# Patient Record
Sex: MALE | Race: WHITE | HISPANIC OR LATINO | URBAN - METROPOLITAN AREA
[De-identification: names, ages, dates, MRNs, and addresses within clinical notes are randomized per-mention and may not be internally consistent; named-entity substitution may affect disease eponyms.]

---

## 2020-10-10 ENCOUNTER — OFFICE VISIT (OUTPATIENT)
Dept: URBAN - METROPOLITAN AREA CLINIC 121 | Facility: CLINIC | Age: 40
End: 2020-10-10

## 2021-03-30 ENCOUNTER — OFFICE VISIT (OUTPATIENT)
Dept: URBAN - METROPOLITAN AREA CLINIC 63 | Facility: CLINIC | Age: 41
End: 2021-03-30

## 2021-06-29 ENCOUNTER — OFFICE VISIT (OUTPATIENT)
Dept: URBAN - METROPOLITAN AREA CLINIC 63 | Facility: CLINIC | Age: 41
End: 2021-06-29

## 2022-06-15 ENCOUNTER — RX ONLY (OUTPATIENT)
Age: 42
Setting detail: RX ONLY
End: 2022-06-15

## 2022-07-09 ENCOUNTER — TELEPHONE ENCOUNTER (OUTPATIENT)
Dept: URBAN - METROPOLITAN AREA CLINIC 121 | Facility: CLINIC | Age: 42
End: 2022-07-09

## 2022-07-09 RX ORDER — OMEPRAZOLE 20 MG/1
CAPSULE, DELAYED RELEASE ORAL
Refills: 0 | OUTPATIENT
Start: 2021-03-09 | End: 2021-03-30

## 2022-07-09 RX ORDER — DOXYCYCLINE HYCLATE 100 MG/1
CAPSULE ORAL
Refills: 0 | OUTPATIENT
Start: 2021-03-30 | End: 2021-03-30

## 2022-07-09 RX ORDER — DOXYCYCLINE HYCLATE 100 MG/1
CAPSULE ORAL
Refills: 0 | OUTPATIENT
Start: 2020-03-29 | End: 2021-03-30

## 2022-07-10 ENCOUNTER — TELEPHONE ENCOUNTER (OUTPATIENT)
Dept: URBAN - METROPOLITAN AREA CLINIC 121 | Facility: CLINIC | Age: 42
End: 2022-07-10

## 2022-07-10 RX ORDER — OMEPRAZOLE 20 MG/1
CAPSULE, DELAYED RELEASE ORAL
Refills: 0 | Status: ACTIVE | COMMUNITY
Start: 2021-03-30

## 2022-09-05 ENCOUNTER — RX ONLY (OUTPATIENT)
Age: 42
Setting detail: RX ONLY
End: 2022-09-05

## 2023-02-21 ENCOUNTER — APPOINTMENT (RX ONLY)
Dept: URBAN - METROPOLITAN AREA CLINIC 114 | Facility: CLINIC | Age: 43
Setting detail: DERMATOLOGY
End: 2023-02-21

## 2023-02-21 DIAGNOSIS — L28.1 PRURIGO NODULARIS: ICD-10-CM

## 2023-02-21 DIAGNOSIS — D22 MELANOCYTIC NEVI: ICD-10-CM

## 2023-02-21 DIAGNOSIS — L81.3 CAFÉ AU LAIT SPOTS: ICD-10-CM

## 2023-02-21 PROBLEM — D22.5 MELANOCYTIC NEVI OF TRUNK: Status: ACTIVE | Noted: 2023-02-21

## 2023-02-21 PROCEDURE — ? COUNSELING

## 2023-02-21 PROCEDURE — 17110 DESTRUCTION B9 LES UP TO 14: CPT

## 2023-02-21 PROCEDURE — ? LIQUID NITROGEN

## 2023-02-21 PROCEDURE — 99203 OFFICE O/P NEW LOW 30 MIN: CPT | Mod: 25

## 2023-02-21 ASSESSMENT — LOCATION DETAILED DESCRIPTION DERM
LOCATION DETAILED: RIGHT MID-UPPER BACK
LOCATION DETAILED: RIGHT DISTAL CALF
LOCATION DETAILED: INFERIOR THORACIC SPINE

## 2023-02-21 ASSESSMENT — LOCATION ZONE DERM
LOCATION ZONE: TRUNK
LOCATION ZONE: LEG

## 2023-02-21 ASSESSMENT — LOCATION SIMPLE DESCRIPTION DERM
LOCATION SIMPLE: UPPER BACK
LOCATION SIMPLE: RIGHT UPPER BACK
LOCATION SIMPLE: RIGHT CALF

## 2023-02-21 NOTE — PROCEDURE: LIQUID NITROGEN
Detail Level: Simple
Consent: The patient's consent was obtained including but not limited to risks of crusting, scabbing, blistering, scarring, darker or lighter pigmentary change, recurrence, incomplete removal and infection.
Spray Paint Text: The liquid nitrogen was applied to the skin utilizing a spray paint frosting technique.
Show Aperture Variable?: Yes
Duration Of Freeze Thaw-Cycle (Seconds): 5-10
Post-Care Instructions: I reviewed with the patient in detail post-care instructions. Patient is to wear sunprotection, and avoid picking at any of the treated lesions. Pt may apply Vaseline to crusted or scabbing areas.
Render Note In Bullet Format When Appropriate: No
Topical Anesthesia Type: liquid nitrogen
Medical Necessity Information: It is in your best interest to select a reason for this procedure from the list below. All of these items fulfill various CMS LCD requirements except the new and changing color options.
Number Of Freeze-Thaw Cycles: 2 freeze-thaw cycles

## 2023-02-21 NOTE — HPI: BODY LOCATION - LEG
How Severe Is Your Condition?: mild
Additional History: Pt states he has not tried to treat area with any meds or creams. Pt says that he finds himself picking or scratching it while watching tv.